# Patient Record
Sex: MALE | Race: WHITE | NOT HISPANIC OR LATINO | URBAN - METROPOLITAN AREA
[De-identification: names, ages, dates, MRNs, and addresses within clinical notes are randomized per-mention and may not be internally consistent; named-entity substitution may affect disease eponyms.]

---

## 2017-06-21 ENCOUNTER — ALLSCRIPTS OFFICE VISIT (OUTPATIENT)
Dept: OTHER | Facility: OTHER | Age: 52
End: 2017-06-21

## 2017-06-21 DIAGNOSIS — A69.20 LYME DISEASE: ICD-10-CM

## 2017-06-22 ENCOUNTER — TRANSCRIBE ORDERS (OUTPATIENT)
Dept: LAB | Facility: CLINIC | Age: 52
End: 2017-06-22

## 2017-06-22 ENCOUNTER — APPOINTMENT (OUTPATIENT)
Dept: LAB | Facility: CLINIC | Age: 52
End: 2017-06-22
Payer: COMMERCIAL

## 2017-06-22 DIAGNOSIS — A69.20 LYME DISEASE: ICD-10-CM

## 2017-06-22 PROCEDURE — 86617 LYME DISEASE ANTIBODY: CPT

## 2017-06-22 PROCEDURE — 36415 COLL VENOUS BLD VENIPUNCTURE: CPT

## 2017-06-22 PROCEDURE — 86618 LYME DISEASE ANTIBODY: CPT

## 2017-06-23 ENCOUNTER — GENERIC CONVERSION - ENCOUNTER (OUTPATIENT)
Dept: OTHER | Facility: OTHER | Age: 52
End: 2017-06-23

## 2017-06-23 LAB
B BURGDOR IGG SER IA-ACNC: 0.17
B BURGDOR IGM SER IA-ACNC: 14.38

## 2017-06-24 LAB
B BURGDOR IGG PATRN SER IB-IMP: NEGATIVE
B BURGDOR IGM PATRN SER IB-IMP: POSITIVE
B BURGDOR18KD IGG SER QL IB: ABNORMAL
B BURGDOR23KD IGG SER QL IB: PRESENT
B BURGDOR23KD IGM SER QL IB: PRESENT
B BURGDOR28KD IGG SER QL IB: ABNORMAL
B BURGDOR30KD IGG SER QL IB: ABNORMAL
B BURGDOR39KD IGG SER QL IB: ABNORMAL
B BURGDOR39KD IGM SER QL IB: PRESENT
B BURGDOR41KD IGG SER QL IB: PRESENT
B BURGDOR41KD IGM SER QL IB: PRESENT
B BURGDOR45KD IGG SER QL IB: ABNORMAL
B BURGDOR58KD IGG SER QL IB: ABNORMAL
B BURGDOR66KD IGG SER QL IB: ABNORMAL
B BURGDOR93KD IGG SER QL IB: PRESENT

## 2017-06-27 ENCOUNTER — GENERIC CONVERSION - ENCOUNTER (OUTPATIENT)
Dept: OTHER | Facility: OTHER | Age: 52
End: 2017-06-27

## 2017-10-12 ENCOUNTER — TRANSCRIBE ORDERS (OUTPATIENT)
Dept: LAB | Facility: CLINIC | Age: 52
End: 2017-10-12

## 2018-01-10 NOTE — RESULT NOTES
Verified Results  (1) URINALYSIS w URINE C/S REFLEX (will reflex a microscopy if leukocytes, occult blood, or nitrites are not within normal limits) [id number] 04:15PM [facility]     Test Name Result Flag Reference   COLOR Yellow     CLARITY Cloudy     PH UA 6 5  4 5-8 0   LEUKOCYTE ESTERASE UA Negative  Negative   NITRITE UA Negative  Negative   PROTEIN UA Negative mg/dl  Negative, >300   GLUCOSE UA Negative mg/dl  Negative   KETONES UA Negative mg/dl  Negative   UROBILINOGEN UA 1 0 E U /dl  0 2, 1 0   BILIRUBIN UA Negative  Negative   BLOOD UA Negative  Negative   SPECIFIC GRAVITY UA 1 010  1 003-1 030     (1) COMPREHENSIVE METABOLIC PANEL [id number] 95:14SG [facility] Kidney Disease Education Program recommendations are as follows:rnGFR calculation is accurate only with a steady state creatininernChronic Kidney disease less than 60 ml/min/1 73 sq  metersrnKidney failure less than 15 ml/min/1 73 sq  meters  Test Name Result Flag Reference   GLUCOSE,RANDM 89 mg/dL     If the patient is fasting, the ADA then defines impaired fasting glucose as > 100 mg/dL and diabetes as > or equal to 123 mg/dL     SODIUM 139 mmol/L  136-145   POTASSIUM 4 5 mmol/L  3 5-5 3   CHLORIDE 105 mmol/L  100-108   CARBON DIOXIDE 25 mmol/L  21-32   ANION GAP (CALC) 9 mmol/L  4-13   BLOOD UREA NITROGEN 13 mg/dL  5-25   CREATININE 0 89 mg/dL  0 60-1 30   Standardized to IDMS reference method   CALCIUM 8 9 mg/dL  8 3-10 1   BILI, TOTAL 0 72 mg/dL  0 20-1 00   ALK PHOSPHATAS 78 U/L     ALT (SGPT) 37 U/L  12-78   AST(SGOT) 17 U/L  5-45   ALBUMIN 3 9 g/dL  3 5-5 0   TOTAL PROTEIN 6 8 g/dL  6 4-8 2   eGFR Non-African American > ml/min/1 73sq m       (1) LIPID PANEL, FASTING [id number] 04:12PM [facility]   Triglyceride:rnrn     Normal              <150 mg/dlrn     Borderline High    150-199 mg/dlrn     High               200-499 mg/dlrn     Very High          >499 mg/dlrnCholesterol: rnrn Desirable        <200 mg/dlrn    Borderline High  200-239 mg/dlrn    High             >239 mg/dlrnHDL Cholesterol:rnrn    High    >59 mg/dLrn    Low     <41 mg/dLrnLDL CALCULATED:rnrnThis screening LDL is a calculated result  rnIt does not have the accuracy of the Direct Measured LDL in the monitoring of patients with hyperlipidemia and/or statin therapy  rnDirect Measure LDL (YPJ124) must be ordered separately in these patients  Test Name Result Flag Reference   CHOLESTEROL 208 mg/dL H    HDL,DIRECT 40 mg/dL  40-60   LDL CHOLESTEROL CALCULATED 142 mg/dL H 0-100   TRIGLYCERIDES 130 mg/dL  <=150     (1) TSH WITH FT4 REFLEX [id number] 04:12PM Lisa Lara   Patients undergoing fluorescein dye angiography may retain small amounts of fluorescein in the body for 48-72 hours post procedure  Samples containing fluorescein can produce falsely depressed TSH values  If the patient had this procedure,a specimen should be resubmitted post fluorescein clearance       Test Name Result Flag Reference   TSH 1 300 uIU/mL  0 358-3 740

## 2018-01-10 NOTE — RESULT NOTES
Verified Results  (1) LYME ANTIBODY PROFILE [facility] TO WESTERN BLOT [id number] 08:59AM Liss Portillo Order Number: [id number]     Test Name Result Flag Reference   LYME IGG 0 17  0 00-0 79   NEGATIVE(0 00-0 79)-Absence of detectable Borrelia IgG Antibodies  A negative result does not exclude the possibility of Borrelia infection  If early Lyme disease is suspected,a second sample should be collected & tested 4 weeks after initial testing  LYME IGM 14 38 H 0 00-0 79   POSITIVE (> or = 1 20) - Presence of Borrelia IgM Antibodies  Current testing guidelines recommend that all positive samples be supplemented by further testing  Sample forwarded to reference lab for western blot assay

## 2018-01-13 VITALS
RESPIRATION RATE: 20 BRPM | DIASTOLIC BLOOD PRESSURE: 80 MMHG | SYSTOLIC BLOOD PRESSURE: 120 MMHG | TEMPERATURE: 98.2 F | OXYGEN SATURATION: 97 % | HEIGHT: 71 IN | HEART RATE: 92 BPM | WEIGHT: 251 LBS | BODY MASS INDEX: 35.14 KG/M2

## 2018-01-14 NOTE — MISCELLANEOUS
Message      Recorded as Task   Date: 02/05/2016 12:42 PM, Created By: Chilo Hunt   Task Name: Go to Result   Assigned To: [facility],Team   Regarding Patient: Alonzo Graham, Status: Active   CommentMontana Chery - 05 Feb 2016 12:42 PM     TASK CREATED  Call as needed for results  Diana Stewart - 05 Feb 2016 2:49 PM     TASK EDITED  Message left for patient to callt he office for results  Diana Stewart - 05 Feb 2016 2:54 PM     TASK EDITED  Patient called and informed o the results  He was told that the polyp was the precancerous type and it is improtnat that he continue having colonoscopies when due  He verbalized understanding  Active Problems    1  Encounter for colorectal cancer screening (V76 51) (Z12 11,Z12 12)   2  Encounter for prostate cancer screening (V76 44) (Z12 5)   3  Hyperlipidemia (272 4) (E78 5)   4  Obesity (278 00) (E66 9)   5  Screening for depression (V79 0) (Z13 89)   6  Umbilical hernia without obstruction and without gangrene (553 1) (K42 9)   7  Vitamin D deficiency (268 9) (E55 9)    Current Meds   1  Atorvastatin Calcium 10 MG Oral Tablet; TAKE 1 TABLET DAILY; Therapy: 61ZCA4835 to (Evaluate:14Apr2016)  Requested for: 47SXB0208; Last   Rx:15Jan2016 Ordered   2  Vitamin D (Ergocalciferol) 48372 UNIT Oral Capsule; TAKE ONE CAPSULE BY MOUTH   WEEKLY; Therapy: 67OXB6693 to (Costa Blank)  Requested for: 52FFK3572; Last   Rx:19Jan2016 Ordered    Allergies    1   No Known Drug Allergies    Signatures   Electronically signed by : Rita Sierra, ; Feb 5 2016  2:54PM EST                       (Author)

## 2018-01-15 NOTE — RESULT NOTES
Verified Results  (1) COMPREHENSIVE METABOLIC PANEL [id number] 02:90NF Elidia    [facility] Kidney Disease Education Program recommendations are as follows:  GFR calculation is accurate only with a steady state creatinine  Chronic Kidney disease less than 60 ml/min/1 73 sq  meters  Kidney failure less than 15 ml/min/1 73 sq  meters  Test Name Result Flag Reference   GLUCOSE,RANDM 89 mg/dL     If the patient is fasting, the ADA then defines impaired fasting glucose as > 100 mg/dL and diabetes as > or equal to 123 mg/dL     SODIUM 139 mmol/L  136-145   POTASSIUM 4 5 mmol/L  3 5-5 3   CHLORIDE 105 mmol/L  100-108   CARBON DIOXIDE 25 mmol/L  21-32   ANION GAP (CALC) 9 mmol/L  4-13   BLOOD UREA NITROGEN 13 mg/dL  5-25   CREATININE 0 89 mg/dL  0 60-1 30   Standardized to IDMS reference method   CALCIUM 8 9 mg/dL  8 3-10 1   BILI, TOTAL 0 72 mg/dL  0 20-1 00   ALK PHOSPHATAS 78 U/L     ALT (SGPT) 37 U/L  12-78   AST(SGOT) 17 U/L  5-45   ALBUMIN 3 9 g/dL  3 5-5 0   TOTAL PROTEIN 6 8 g/dL  6 4-8 2   eGFR Non-African American      >60 0 ml/min/1 73sq m

## 2018-01-15 NOTE — MISCELLANEOUS
Message      Recorded as Task   Date: 02/05/2016 12:42 PM, Created By: Marquis Borja   Task Name: Go to Result   Assigned To: [facility],Team   Regarding Patient: Tabatha Elias, Status: Active   CommentMargaret Nichols - 05 Feb 2016 12:42 PM     TASK CREATED  Call as needed for results  Diana Stewrat - 05 Feb 2016 2:49 PM     TASK EDITED  Message left for patient to callt he office for results  Diana Stewart - 05 Feb 2016 2:54 PM     TASK EDITED  Patient called and informed o the results  He was told that the polyp was the precancerous type and it is importance that he continue having colonoscopies when due  He verbalized understanding  Active Problems    1  Encounter for colorectal cancer screening (V76 51) (Z12 11,Z12 12)   2  Encounter for prostate cancer screening (V76 44) (Z12 5)   3  Hyperlipidemia (272 4) (E78 5)   4  Obesity (278 00) (E66 9)   5  Screening for depression (V79 0) (Z13 89)   6  Umbilical hernia without obstruction and without gangrene (553 1) (K42 9)   7  Vitamin D deficiency (268 9) (E55 9)    Current Meds   1  Atorvastatin Calcium 10 MG Oral Tablet; TAKE 1 TABLET DAILY; Therapy: 00CNM1306 to (Evaluate:14Apr2016)  Requested for: 39HKW6122; Last   Rx:15Jan2016 Ordered   2  Vitamin D (Ergocalciferol) 48065 UNIT Oral Capsule; TAKE ONE CAPSULE BY MOUTH   WEEKLY; Therapy: 87NQN2328 to (Freedom Barroso)  Requested for: 04KCO3406; Last   Rx:19Jan2016 Ordered    Allergies    1   No Known Drug Allergies    Signatures   Electronically signed by : Reji Jimenez MD; Feb 15 2016 12:23PM EST                       (Author)

## 2018-01-16 NOTE — RESULT NOTES
Verified Results  (1) LYME ANTIBODY PROFILE [facility] TO WESTERN BLOT [id number] 08:59AM Desire Nair Order Number: [id number]     Test Name Result Flag Reference   LYME IGG 0 17  0 00-0 79   NEGATIVE(0 00-0 79)-Absence of detectable Borrelia IgG Antibodies  A negative result does not exclude the possibility of Borrelia infection  If early Lyme disease is suspected,a second sample should be collected & tested 4 weeks after initial testing  LYME IGM 14 38 H 0 00-0 79   POSITIVE (> or = 1 20) - Presence of Borrelia IgM Antibodies  Current testing guidelines recommend that all positive samples be supplemented by further testing  Sample forwarded to reference lab for western blot assay  LYME 18 KD IGG Absent     LYME 23 KD IGG Present A    LYME 28 KD IGG Absent     LYME 30 KD IGG Absent     LYME 39 KD IGG Absent     LYME 39 KD IGM Present A    LYME 41 KD IGG Present A    LYME 45 KD IGG Absent     LYME 58 KD IGG Absent     LYME 66 KD IGG Absent     LYME 93 KD IGG Present A    LYME 23 KD IGM Present A    LYME 41 KD IGM Present A    LYME IGG WB INTERP  Negative     Positive: 5 of the following                                 Borrelia-specific bands:                                 18,23,28,30,39,41,45,58,                                 66, and 93  Negative: No bands or banding                                 patterns which do not                                 meet positive criteria  LYME IGM WB INTERP  Positive A    Note: An equivocal or positive EIA result followed by a negative  Western Blot result is considered NEGATIVE  An equivocal or positive  EIA result followed by a positive Western Blot is considered POSITIVE  by the CDC  Positive: 2 of the following bands: 23,39 or 41  Negative: No bands or banding patterns which do not meet positive  criteria    Criteria for positivity are those recommended by CDC/ASTPHLD   p23=Osp C, n57=jchiquujk  Note:  Sera from individuals with the following may cross react in the  Lyme Western Blot assays: other spirochetal diseases (periodontal  disease, leptospirosis, relapsing fever, yaws, and pinta);  connective autoimmune (Rheumatoid Arthritis and Systemic Lupus  Erythematosus and also individuals with Antinuclear Antibody);  other infections [facility] Spotted Fever; Romi-Barr Virus,  and Cytomegalovirus)      Performed at:  [address]  [id number]  : Clarice Doyle MD, Phone:  [phone]

## 2018-01-16 NOTE — MISCELLANEOUS
Message  Mailed colono letter to patients home address  Tasked PCPs office  {Patients needs repeat colono in ONE YEAR = task created }      Active Problems    1  Encounter for colorectal cancer screening (V76 51) (Z12 11,Z12 12)   2  Encounter for prostate cancer screening (V76 44) (Z12 5)   3  Hyperlipidemia (272 4) (E78 5)   4  Obesity (278 00) (E66 9)   5  Screening for depression (V79 0) (Z13 89)   6  Umbilical hernia without obstruction and without gangrene (553 1) (K42 9)   7  Vitamin D deficiency (268 9) (E55 9)    Current Meds   1  Atorvastatin Calcium 10 MG Oral Tablet; TAKE 1 TABLET DAILY; Therapy: 33IFB7555 to (Evaluate:14Apr2016)  Requested for: 24XNP3042; Last   Rx:15Jan2016 Ordered   2  Vitamin D (Ergocalciferol) 48543 UNIT Oral Capsule; TAKE ONE CAPSULE BY MOUTH   WEEKLY; Therapy: 89SZX6409 to (José Morales)  Requested for: 53SCR0451; Last   Rx:19Jan2016 Ordered    Allergies    1   No Known Drug Allergies    Signatures   Electronically signed by : Anastacia Bustillos, ; Feb 11 2016  9:31AM EST                       (Author)

## 2018-01-16 NOTE — RESULT NOTES
Verified Results  COLONOSCOPY [id number] 12:00AM Larson Enter     Test Name Result Flag Reference   Colonoscopy (Summary)        Summary / No summary entered :      No summary entered   Documents attached :      EPIC OP NOTE - Ladan Goldstein; Enc: [id number] - Appointment -      Ladan Goldstein - (General Surgery) (Result Document)

## 2018-01-18 NOTE — MISCELLANEOUS
Message   Recorded as Task   Date: 02/03/2016 10:42 AM, Created By: Ruthy Wu   Task Name: Follow Up   Assigned To: [facility],Team   Regarding Patient: Kalyn Noland, Status: Active   Comment:    Ruthy Wu - 03 Feb 2016 10:42 AM     TASK CREATED  Caller: Lei Daniel  Routine post colonoscopy call placed to patient  He had his procedure on 2/2/16  He had no problems to report or questions at the time of the call  Pathology is pending  Diana Stewart - 05 Feb 2016 2:49 PM     TASK EDITED  Pathology is back and reviewed  Active Problems    1  Encounter for colorectal cancer screening (V76 51) (Z12 11,Z12 12)   2  Encounter for prostate cancer screening (V76 44) (Z12 5)   3  Hyperlipidemia (272 4) (E78 5)   4  Obesity (278 00) (E66 9)   5  Screening for depression (V79 0) (Z13 89)   6  Umbilical hernia without obstruction and without gangrene (553 1) (K42 9)   7  Vitamin D deficiency (268 9) (E55 9)    Current Meds   1  Atorvastatin Calcium 10 MG Oral Tablet; TAKE 1 TABLET DAILY; Therapy: 58CXT4454 to (Evaluate:14Apr2016)  Requested for: 59ISR7111; Last   Rx:15Jan2016 Ordered   2  Vitamin D (Ergocalciferol) 62509 UNIT Oral Capsule; TAKE ONE CAPSULE BY MOUTH   WEEKLY; Therapy: 27OTP4897 to (Angle Toth)  Requested for: 78QKM0724; Last   Rx:19Jan2016 Ordered    Allergies    1   No Known Drug Allergies    Signatures   Electronically signed by : Lei Daniel, ; Feb 5 2016  2:49PM EST                       (Author)